# Patient Record
Sex: MALE | Race: WHITE | NOT HISPANIC OR LATINO | URBAN - METROPOLITAN AREA
[De-identification: names, ages, dates, MRNs, and addresses within clinical notes are randomized per-mention and may not be internally consistent; named-entity substitution may affect disease eponyms.]

---

## 2020-03-16 ENCOUNTER — APPOINTMENT (EMERGENCY)
Dept: RADIOLOGY | Facility: HOSPITAL | Age: 35
End: 2020-03-16
Attending: EMERGENCY MEDICINE
Payer: COMMERCIAL

## 2020-03-16 ENCOUNTER — HOSPITAL ENCOUNTER (EMERGENCY)
Facility: HOSPITAL | Age: 35
Discharge: HOME | End: 2020-03-16
Attending: EMERGENCY MEDICINE
Payer: COMMERCIAL

## 2020-03-16 VITALS
RESPIRATION RATE: 16 BRPM | HEART RATE: 89 BPM | SYSTOLIC BLOOD PRESSURE: 137 MMHG | TEMPERATURE: 97.5 F | OXYGEN SATURATION: 98 % | DIASTOLIC BLOOD PRESSURE: 64 MMHG

## 2020-03-16 DIAGNOSIS — N45.1 EPIDIDYMITIS: Primary | ICD-10-CM

## 2020-03-16 LAB
BILIRUB UR QL STRIP.AUTO: NEGATIVE MG/DL
CLARITY UR REFRACT.AUTO: CLEAR
COLOR UR AUTO: YELLOW
GLUCOSE BLD-MCNC: 168 MG/DL (ref 70–99)
GLUCOSE BLOOD, POC: 168
GLUCOSE UR STRIP.AUTO-MCNC: >=1000 MG/DL
HGB UR QL STRIP.AUTO: NEGATIVE
KETONES UR STRIP.AUTO-MCNC: NEGATIVE MG/DL
LEUKOCYTE ESTERASE UR QL STRIP.AUTO: NEGATIVE
NITRITE UR QL STRIP.AUTO: NEGATIVE
PH UR STRIP.AUTO: 6 [PH]
POCT TEST: ABNORMAL
PROT UR QL STRIP.AUTO: NEGATIVE
SP GR UR REFRACT.AUTO: 1.03
UROBILINOGEN UR STRIP-ACNC: 0.2 EU/DL

## 2020-03-16 PROCEDURE — 99285 EMERGENCY DEPT VISIT HI MDM: CPT | Mod: 25

## 2020-03-16 PROCEDURE — 93975 VASCULAR STUDY: CPT

## 2020-03-16 PROCEDURE — 81003 URINALYSIS AUTO W/O SCOPE: CPT | Performed by: PHYSICIAN ASSISTANT

## 2020-03-16 PROCEDURE — 76870 US EXAM SCROTUM: CPT

## 2020-03-16 RX ORDER — HUMAN INSULIN 100 [IU]/ML
INJECTION, SUSPENSION SUBCUTANEOUS
COMMUNITY
Start: 2019-12-25

## 2020-03-16 RX ORDER — INSULIN ASPART 100 [IU]/ML
INJECTION, SOLUTION INTRAVENOUS; SUBCUTANEOUS
COMMUNITY
Start: 2020-01-15

## 2020-03-16 ASSESSMENT — ENCOUNTER SYMPTOMS
FEVER: 0
VOMITING: 0
FREQUENCY: 0
HEMATURIA: 0
ABDOMINAL PAIN: 0
FLANK PAIN: 0
DYSURIA: 0
DIFFICULTY URINATING: 0
SHORTNESS OF BREATH: 0
CHILLS: 0
LIGHT-HEADEDNESS: 0
COUGH: 0
HEADACHES: 0
APPETITE CHANGE: 0
DIZZINESS: 0
NAUSEA: 0

## 2020-03-16 NOTE — ED PROVIDER NOTES
"HPI     Chief Complaint   Patient presents with   • Groin Pain       Patient is a 34-year-old male with past medical history of type 1 diabetes presenting with chief complaint of testicular pain.  Patient states the last 2 days he has been experiencing discomfort in his left testicle, typically when he sits down or does touch.  He notes last night/early this morning he felt that he had \"a second testicle\" in his left scrotum.  He was concerned regarding this abnormal feeling and called the urology office.  Patient admits he does not have an established urologist therefore the office advised him to go to the ER to rule out a testicular torsion.  Patient denies any history of trauma to the area.  He has never had symptoms like this in the past.  He admits he is sexually active with one partner, no history or concern for STD.  He denies any changes in urination and is otherwise feeling well.      History provided by:  Patient  Groin Pain   Associated symptoms: no abdominal pain, no chest pain, no cough, no fever, no headaches, no nausea, no shortness of breath and no vomiting         Patient History     Past Medical History:   Diagnosis Date   • Diabetes mellitus type I (CMS/[facility])        History reviewed. No pertinent surgical history.    History reviewed. No pertinent family history.    Social History     Tobacco Use   • Smoking status: Never Smoker   • Smokeless tobacco: Never Used   Substance Use Topics   • Alcohol use: Yes   • Drug use: Not Currently       Systems Reviewed from Nursing Triage:          Review of Systems     Review of Systems   Constitutional: Negative for appetite change, chills and fever.   Respiratory: Negative for cough and shortness of breath.    Cardiovascular: Negative for chest pain.   Gastrointestinal: Negative for abdominal pain, nausea and vomiting.   Genitourinary: Positive for scrotal swelling and testicular pain. Negative for decreased urine volume, difficulty urinating, discharge, " dysuria, flank pain, frequency, genital sores, hematuria, penile pain and urgency.   Neurological: Negative for dizziness, light-headedness and headaches.        Physical Exam     ED Triage Vitals   Temp Heart Rate Resp BP SpO2   03/16/20 1122 03/16/20 1122 03/16/20 1122 03/16/20 1122 03/16/20 1122   36.4 °C (97.5 °F) 89 16 (!) 145/90 98 %      Temp Source Heart Rate Source Patient Position BP Location FiO2 (%) (Set)   03/16/20 1122 -- 03/16/20 1132 03/16/20 1132 --   Tympanic  Sitting Right upper arm                      Patient Vitals for the past 24 hrs:   BP Temp Temp src Pulse Resp SpO2   03/16/20 1132 137/64 -- -- -- -- --   03/16/20 1122 (!) 145/90 36.4 °C (97.5 °F) Tympanic 89 16 98 %                                       Physical Exam   Constitutional: He is oriented to person, place, and time. He appears well-developed and well-nourished. No distress.   HENT:   Head: Normocephalic and atraumatic.   Eyes: Pupils are equal, round, and reactive to light. Conjunctivae and EOM are normal.   Neck: Normal range of motion.   Cardiovascular: Normal rate and regular rhythm.   Pulmonary/Chest: Effort normal and breath sounds normal. No stridor. No respiratory distress. He has no wheezes.   Abdominal: Soft. He exhibits no distension. There is no tenderness. Hernia confirmed negative in the right inguinal area and confirmed negative in the left inguinal area.   Genitourinary: Cremasteric reflex is present. Right testis shows no swelling and no tenderness. Left testis shows swelling and tenderness (mild posteriorly). Uncircumcised. No penile erythema or penile tenderness. No discharge found.   Genitourinary Comments: ANTHONY Diaz at bedside as chaperone for testicular exam   Neurological: He is alert and oriented to person, place, and time.   Skin: Skin is warm and dry.   Nursing note and vitals reviewed.           Procedures    ED Course & MDM     Labs Reviewed   UA REFLEX CULTURE (MACROSCOPIC) - Abnormal       Result Value     Color, Urine Yellow      Clarity, Urine Clear      Specific Gravity, Urine 1.026      pH, Urine 6.0      Leukocyte Esterase Negative      Nitrite, Urine Negative      Protein, Urine Negative      Glucose, Urine >=1000 (*)     Ketones, Urine Negative      Urobilinogen, Urine 0.2      Bilirubin, Urine Negative      Blood, Urine Negative     POCT GLUCOSE (BEAKER) - Abnormal    POCT Bedside Glucose 168 (*)     POC Test POC     POCT GLUCOSE - Normal    Glucose Blood,      URINALYSIS REFLEX CULTURE (ED AND OUTPATIENT ONLY)    Narrative:     The following orders were created for panel order Urinalysis w/ reflex culture.  Procedure                               Abnormality         Status                     ---------                               -----------         ------                     UA Reflex to Culture (Ma...[[id number]]  Abnormal            Final result                 Please view results for these tests on the individual orders.       ULTRASOUND SCROTUM   Final Result   IMPRESSION:   No evidence for testicular torsion.   No evidence for testicular mass.   1.5 cm left-sided epididymal head cyst.      ULTRASOUND DOPPLER   Final Result   IMPRESSION:   No evidence for testicular torsion.   No evidence for testicular mass.   1.5 cm left-sided epididymal head cyst.                  [facility]         ED Course as of Mar 16 1338   Mon Mar 16, 2020   1235 IMPRESSION:  No evidence for testicular torsion.  No evidence for testicular mass.  1.5 cm left-sided epididymal head cyst.   ULTRASOUND SCROTUM [KM]   1235 No concern to treat with abx. Pt to f/u with PCP & urology. Advised to take motrin prn for pain, return if worsening.    [KM]   1241 POCT Bedside Glucose(!): 168 [KM]      ED Course User Index  [KM] Emy Benedict PA C         Clinical Impressions as of Mar 16 1338   Epididymitis     Dispo  Discharge     Emy Benedict PA C  03/16/20 1338

## 2020-03-16 NOTE — DISCHARGE INSTRUCTIONS
Motrin 600 mg with food every 6-8 hours as needed for pain.    Call the urologist to establish care for follow-up.    Always follow-up with a PCP as well.  If you need one, you may see [facility].    Return to the ER if you experience worsening of symptoms, fever/chills or any other new concerns.

## 2020-03-16 NOTE — ED ATTESTATION NOTE
I have personally seen and examined the patient.  I reviewed and agree with physician assistant / nurse practitioner’s assessment and plan of care.     Exam: Patient is resting comfortably no acute distress.  His vital signs are stable and he is afebrile.  Testicular exam performed by the PA is unremarkable.    Plan: Ultrasound is negative for torsion or testicular mass.  There is the presence of a left epididymal cyst and clinically picture is consistent with epididymitis.  Will recommend anti-inflammatories as there is no concern for STD exposure.  Recommended tight fitting boxers and urology follow-up           Bogdan Aviles DO  03/16/20 1336

## 2021-04-13 DIAGNOSIS — Z23 ENCOUNTER FOR IMMUNIZATION: ICD-10-CM
